# Patient Record
Sex: FEMALE | Race: WHITE | NOT HISPANIC OR LATINO | URBAN - METROPOLITAN AREA
[De-identification: names, ages, dates, MRNs, and addresses within clinical notes are randomized per-mention and may not be internally consistent; named-entity substitution may affect disease eponyms.]

---

## 2018-02-14 ENCOUNTER — OFFICE VISIT (OUTPATIENT)
Dept: URGENT CARE | Facility: URGENT CARE | Age: 53
End: 2018-02-14
Payer: COMMERCIAL

## 2018-02-14 VITALS
SYSTOLIC BLOOD PRESSURE: 120 MMHG | TEMPERATURE: 99.5 F | OXYGEN SATURATION: 98 % | DIASTOLIC BLOOD PRESSURE: 77 MMHG | RESPIRATION RATE: 16 BRPM | HEART RATE: 78 BPM

## 2018-02-14 DIAGNOSIS — R05.9 COUGH: Primary | ICD-10-CM

## 2018-02-14 DIAGNOSIS — J10.1 INFLUENZA A: ICD-10-CM

## 2018-02-14 LAB
FLUAV+FLUBV AG SPEC QL: NEGATIVE
FLUAV+FLUBV AG SPEC QL: POSITIVE
SPECIMEN SOURCE: ABNORMAL

## 2018-02-14 PROCEDURE — 99203 OFFICE O/P NEW LOW 30 MIN: CPT | Performed by: PHYSICIAN ASSISTANT

## 2018-02-14 PROCEDURE — 87804 INFLUENZA ASSAY W/OPTIC: CPT | Performed by: PHYSICIAN ASSISTANT

## 2018-02-14 RX ORDER — ONDANSETRON 4 MG/1
4 TABLET, FILM COATED ORAL EVERY 6 HOURS PRN
Qty: 18 TABLET | Refills: 0 | Status: SHIPPED | OUTPATIENT
Start: 2018-02-14

## 2018-02-14 RX ORDER — OSELTAMIVIR PHOSPHATE 75 MG/1
75 CAPSULE ORAL 2 TIMES DAILY
Qty: 10 CAPSULE | Refills: 0 | Status: SHIPPED | OUTPATIENT
Start: 2018-02-14

## 2018-02-14 ASSESSMENT — ENCOUNTER SYMPTOMS
NAUSEA: 1
FEVER: 1
VOMITING: 1
CHILLS: 1
DIARRHEA: 0
SPUTUM PRODUCTION: 0
SHORTNESS OF BREATH: 0
WHEEZING: 0

## 2018-02-14 NOTE — MR AVS SNAPSHOT
"              After Visit Summary   2018    Ebony Gunn    MRN: [id number]           Patient Information     Date Of Birth          1965        Visit Information        Provider Department      2018 6:30 PM Dang Roger PA-C [facility]        Today's Diagnoses     Cough    -  1    Influenza A           Follow-ups after your visit        Who to contact     If you have questions or need follow up information about today's clinic visit or your schedule please contact [facility] directly at [phone].  Normal or non-critical lab and imaging results will be communicated to you by modulRhart, letter or phone within 4 business days after the clinic has received the results. If you do not hear from us within 7 days, please contact the clinic through modulRhart or phone. If you have a critical or abnormal lab result, we will notify you by phone as soon as possible.  Submit refill requests through [employer] or call your pharmacy and they will forward the refill request to us. Please allow 3 business days for your refill to be completed.          Additional Information About Your Visit        MyChart Information     [employer] [facility] lets you send messages to your doctor, view your test results, renew your prescriptions, schedule appointments and more. To sign up, go to [email or web address] . Click on \"Log in\" on the left side of the screen, which will take you to the Welcome page. Then click on \"Sign up Now\" on the right side of the page.     You will be asked to enter the access code listed below, as well as some personal information. Please follow the directions to create your username and password.     Your access code is: [email or web address]  Expires: 5/15/2018  7:45 PM     Your access code will  in 90 days. If you need help or a new code, please call your [facility] or [phone].        Care EveryWhere ID     This is your Care EveryWhere ID. " This could be used by other organizations to access your [place] medical records  [id number]        Your Vitals Were     Pulse Temperature Respirations Pulse Oximetry          78 99.5  F (37.5  C) (Oral) 16 98%         Blood Pressure from Last 3 Encounters:   02/14/18 120/77    Weight from Last 3 Encounters:   No data found for Wt              We Performed the Following     Influenza A/B antigen          Today's Medication Changes          These changes are accurate as of 2/14/18  7:45 PM.  If you have any questions, ask your nurse or doctor.               Start taking these medicines.        Dose/Directions    ondansetron 4 MG tablet   Commonly known as:  ZOFRAN   Used for:  Influenza A        Dose:  4 mg   Take 1 tablet (4 mg) by mouth every 6 hours as needed for nausea   Quantity:  18 tablet   Refills:  0       oseltamivir 75 MG capsule   Commonly known as:  TAMIFLU   Used for:  Influenza A        Dose:  75 mg   Take 1 capsule (75 mg) by mouth 2 times daily   Quantity:  10 capsule   Refills:  0            Where to get your medicines      These medications were sent to [facility] [address] [place] [address] [address] [place] [address] & [address] [address]    Hours:  24-hours Phone:  [phone]     ondansetron 4 MG tablet    oseltamivir 75 MG capsule                Primary Care Provider Office Phone # Fax #    Amanda Emerson [phone]       [place] [address] [address] [place] [id number]        Equal Access to Services     BAR MAYER AH: Hadii adelia tapiao Sohayley, waaxda luqadaha, qaybta kaalmada josh, wax vivi carney adehaider du. So [facility] [phone].    ATENCIÓN: Si habla español, tiene a winters disposición servicios gratuitos de asistencia lingüística. Scottie al [phone].    We comply with applicable federal civil rights laws and [place] laws. We do not discriminate on the basis of race, color, national origin, age,  disability, sex, sexual orientation, or gender identity.            Thank you!     Thank you for choosing [facility]  for your care. Our goal is always to provide you with excellent care. Hearing back from our patients is one way we can continue to improve our services. Please take a few minutes to complete the written survey that you may receive in the mail after your visit with us. Thank you!             Your Updated Medication List - Protect others around you: Learn how to safely use, store and throw away your medicines at www.disposemymeds.org.          This list is accurate as of 2/14/18  7:45 PM.  Always use your most recent med list.                   Brand Name Dispense Instructions for use Diagnosis    ondansetron 4 MG tablet    ZOFRAN    18 tablet    Take 1 tablet (4 mg) by mouth every 6 hours as needed for nausea    Influenza A       oseltamivir 75 MG capsule    TAMIFLU    10 capsule    Take 1 capsule (75 mg) by mouth 2 times daily    Influenza A

## 2018-02-15 NOTE — NURSING NOTE
Chief Complaint   Patient presents with     Flu Symptoms     Started a few days ago. Vomiting, recently passed out, fell and hit chin. Headache, body aches, fevers, phlegm-green.       Initial /77  Pulse 78  Temp 99.5  F (37.5  C) (Oral)  Resp 16  SpO2 98% There is no height or weight on file to calculate BMI.  Medication Reconciliation: complete     Felipe Albarran, CMA

## 2018-02-15 NOTE — PROGRESS NOTES
"SUBJECTIVE:   Ebony Gunn is a 52 year old female presenting with a chief complaint of   Chief Complaint   Patient presents with     Flu Symptoms     Started a few days ago. Vomiting, recently passed out, fell and hit chin. Headache, body aches, fevers, phlegm-green.   .    Onset of symptoms was yesterday when she 'felt like she got hit by a truck' with muscle aches/joint pain, HA, fever, dry cough  Also has +nausea and vomited a few times.  No diarrhea.   Noted today she got light headed when throwing up and \"may have passed out for 20 or 30 seconds\" and bumped her chin on the side table (no injury or bleeding).     Her  was sick a week ago with high fever, body aches and cough but was not tested nor treated for influenza (\"he treated it on his own with fluids and rest).     Review of Systems   Constitutional: Positive for chills, fever and malaise/fatigue.   Respiratory: Negative for sputum production, shortness of breath and wheezing.    Gastrointestinal: Positive for nausea and vomiting. Negative for diarrhea.         No past medical history on file.  No current outpatient prescriptions on file.     Social History   Substance Use Topics     Smoking status: Not on file     Smokeless tobacco: Not on file     Alcohol use Not on file       OBJECTIVE  /77  Pulse 78  Temp 99.5  F (37.5  C) (Oral)  Resp 16  SpO2 98%    Physical Exam   Constitutional: She is well-developed, well-nourished, and in no distress.   HENT:   Right Ear: External ear normal.   Left Ear: External ear normal.   Nose: Nose normal.   Mouth/Throat: Oropharynx is clear and moist.   Cardiovascular: Normal rate, regular rhythm and normal heart sounds.    Pulmonary/Chest: Effort normal and breath sounds normal.   Skin: She is diaphoretic.       Labs:  + flu test for INFLUENZA A       ASSESSMENT:      ICD-10-CM    1. Cough R05 Influenza A/B antigen        Medical Decision Making:    Influenza A with fever, dry cough, body aches, " nausea and vomiting   Vasovagal episode       PLAN:    URI Adult:  Tylenol, Ibuprofen, Fluids, Rest  Tamiflu x 5 days   Prn zofran     Cont supportive cares, if not improving then report to ER.        Dang Roger PA-C ([place])   7:44 PM  February 14, 2018

## 2021-04-04 ENCOUNTER — NURSE TRIAGE (OUTPATIENT)
Dept: NURSING | Facility: CLINIC | Age: 56
End: 2021-04-04

## 2021-04-05 NOTE — TELEPHONE ENCOUNTER
Says she got appt for herself for covid vaccine but none for spouse. Found single opening tomorrow at 8:30 am. Advised this likely was a cancellation pt found; keep checking back for more cancellations or other openings. Gave ph# for Covid Vaccine Scheduling line.

## 2021-04-05 NOTE — TELEPHONE ENCOUNTER
Reason for Disposition    COVID-19 Prevention and Healthy Living, questions about    Protocols used: CORONAVIRUS (COVID-19) VACCINE QUESTIONS AND VDUHLRMPU-I-EP 1.3

## 2021-04-08 ENCOUNTER — IMMUNIZATION (OUTPATIENT)
Dept: LAB | Facility: CLINIC | Age: 56
End: 2021-04-08
Payer: COMMERCIAL

## 2023-05-05 ENCOUNTER — TELEPHONE (OUTPATIENT)
Dept: NURSING | Facility: CLINIC | Age: 58
End: 2023-05-05
Payer: COMMERCIAL

## 2023-05-05 NOTE — TELEPHONE ENCOUNTER
Nurse Triage SBAR    Is this a 2nd Level Triage? NO    Situation: Patient calling with request to schedule appt for 2nd opinion consult .  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  Last CT in Feb  And xray was last week.      States she wants a 2nd opinion from Dr Evonne Zurita - writer transferred pt to specialty care t o schedule this appt.          Dipika Gonzales, RN [facility] 5/5/2023 9:39 AM

## 2023-11-22 ENCOUNTER — NURSE TRIAGE (OUTPATIENT)
Dept: NURSING | Facility: CLINIC | Age: 58
End: 2023-11-22
Payer: COMMERCIAL

## 2023-11-22 NOTE — TELEPHONE ENCOUNTER
Patient transferred from scheduling, patient states that she has a swollen eye and was told that triage could get her an appointment. Advised patient that I can triage her symptoms but since she is not established at [facility] at all she would need to go to UC or ED depending on her symptoms. Patient refusing UC and ED and states that she will keep calling around until she can find an appointment. No triage.     VISH BARBER RN